# Patient Record
Sex: FEMALE | Race: BLACK OR AFRICAN AMERICAN | NOT HISPANIC OR LATINO | URBAN - METROPOLITAN AREA
[De-identification: names, ages, dates, MRNs, and addresses within clinical notes are randomized per-mention and may not be internally consistent; named-entity substitution may affect disease eponyms.]

---

## 2020-08-20 ENCOUNTER — TELEPHONE ENCOUNTER (OUTPATIENT)
Dept: URBAN - METROPOLITAN AREA CLINIC 113 | Facility: CLINIC | Age: 35
End: 2020-08-20

## 2020-09-09 ENCOUNTER — OFFICE VISIT (OUTPATIENT)
Dept: URBAN - METROPOLITAN AREA CLINIC 113 | Facility: CLINIC | Age: 35
End: 2020-09-09
Payer: COMMERCIAL

## 2020-09-09 ENCOUNTER — DASHBOARD ENCOUNTERS (OUTPATIENT)
Age: 35
End: 2020-09-09

## 2020-09-09 ENCOUNTER — WEB ENCOUNTER (OUTPATIENT)
Dept: URBAN - METROPOLITAN AREA CLINIC 113 | Facility: CLINIC | Age: 35
End: 2020-09-09

## 2020-09-09 VITALS
SYSTOLIC BLOOD PRESSURE: 120 MMHG | HEIGHT: 61 IN | WEIGHT: 185 LBS | TEMPERATURE: 97.7 F | HEART RATE: 86 BPM | RESPIRATION RATE: 20 BRPM | DIASTOLIC BLOOD PRESSURE: 93 MMHG | BODY MASS INDEX: 34.93 KG/M2

## 2020-09-09 DIAGNOSIS — E11.9 TYPE 2 DIABETES MELLITUS WITHOUT COMPLICATION, WITHOUT LONG-TERM CURRENT USE OF INSULIN: ICD-10-CM

## 2020-09-09 DIAGNOSIS — R05 COUGH: ICD-10-CM

## 2020-09-09 PROBLEM — 49727002: Status: ACTIVE | Noted: 2020-09-09

## 2020-09-09 PROBLEM — 313436004: Status: ACTIVE | Noted: 2020-09-09

## 2020-09-09 PROBLEM — 16932000: Status: ACTIVE | Noted: 2020-09-09

## 2020-09-09 PROCEDURE — G8427 DOCREV CUR MEDS BY ELIG CLIN: HCPCS | Performed by: INTERNAL MEDICINE

## 2020-09-09 PROCEDURE — G9903 PT SCRN TBCO ID AS NON USER: HCPCS | Performed by: INTERNAL MEDICINE

## 2020-09-09 PROCEDURE — 1036F TOBACCO NON-USER: CPT | Performed by: INTERNAL MEDICINE

## 2020-09-09 PROCEDURE — 99203 OFFICE O/P NEW LOW 30 MIN: CPT | Performed by: INTERNAL MEDICINE

## 2020-09-09 PROCEDURE — G8421 BMI NOT CALCULATED: HCPCS | Performed by: INTERNAL MEDICINE

## 2020-09-09 NOTE — HPI-TODAY'S VISIT:
36 y/o female with a hx of chronic cough. She had COVID-19 July 2020 and was also diagnosed with new-onset diabetes. She has had high BG  and is managed with insulin therapy.  RE: her cough she has had sinoplasty as well as PPI therapy; none of that helped. Her sinoplasty was June 19, 2019. Shes states that she has post-nasal drip. She has been on allergy medication in the past; however that did not help either. She has seen a GI MD in the past. She has had an EGD in the past twice and states that they were all normal. She also thinks that she has had pH testing.